# Patient Record
Sex: FEMALE | Race: WHITE | NOT HISPANIC OR LATINO | Employment: UNEMPLOYED | URBAN - METROPOLITAN AREA
[De-identification: names, ages, dates, MRNs, and addresses within clinical notes are randomized per-mention and may not be internally consistent; named-entity substitution may affect disease eponyms.]

---

## 2020-02-24 ENCOUNTER — OFFICE VISIT (OUTPATIENT)
Dept: FAMILY MEDICINE CLINIC | Facility: CLINIC | Age: 40
End: 2020-02-24

## 2020-02-24 VITALS
HEART RATE: 88 BPM | OXYGEN SATURATION: 97 % | WEIGHT: 189 LBS | DIASTOLIC BLOOD PRESSURE: 74 MMHG | SYSTOLIC BLOOD PRESSURE: 131 MMHG | TEMPERATURE: 98.4 F

## 2020-02-24 DIAGNOSIS — Z00.00 PREVENTATIVE HEALTH CARE: ICD-10-CM

## 2020-02-24 DIAGNOSIS — R30.0 DYSURIA: ICD-10-CM

## 2020-02-24 DIAGNOSIS — G89.4 CHRONIC PAIN SYNDROME: ICD-10-CM

## 2020-02-24 DIAGNOSIS — Z71.6 ENCOUNTER FOR SMOKING CESSATION COUNSELING: ICD-10-CM

## 2020-02-24 DIAGNOSIS — F33.2: Primary | ICD-10-CM

## 2020-02-24 DIAGNOSIS — N30.01 ACUTE CYSTITIS WITH HEMATURIA: ICD-10-CM

## 2020-02-24 DIAGNOSIS — R53.83 FATIGUE, UNSPECIFIED TYPE: ICD-10-CM

## 2020-02-24 DIAGNOSIS — F41.9 ANXIETY: ICD-10-CM

## 2020-02-24 DIAGNOSIS — F17.200 SMOKER: ICD-10-CM

## 2020-02-24 LAB
ALBUMIN SERPL-MCNC: 4.9 G/DL (ref 3.5–5.2)
ALBUMIN/GLOB SERPL: 2.1 G/DL
ALP SERPL-CCNC: 89 U/L (ref 39–117)
ALT SERPL W P-5'-P-CCNC: 12 U/L (ref 1–33)
ANION GAP SERPL CALCULATED.3IONS-SCNC: 13.6 MMOL/L (ref 5–15)
AST SERPL-CCNC: 9 U/L (ref 1–32)
BASOPHILS # BLD AUTO: 0.05 10*3/MM3 (ref 0–0.2)
BASOPHILS NFR BLD AUTO: 0.5 % (ref 0–1.5)
BILIRUB BLD-MCNC: NEGATIVE MG/DL
BILIRUB SERPL-MCNC: 0.2 MG/DL (ref 0.2–1.2)
BUN BLD-MCNC: 13 MG/DL (ref 6–20)
BUN/CREAT SERPL: 16.7 (ref 7–25)
CALCIUM SPEC-SCNC: 10.2 MG/DL (ref 8.6–10.5)
CHLORIDE SERPL-SCNC: 97 MMOL/L (ref 98–107)
CHOLEST SERPL-MCNC: 168 MG/DL (ref 0–200)
CLARITY, POC: CLEAR
CO2 SERPL-SCNC: 26.4 MMOL/L (ref 22–29)
COLOR UR: YELLOW
CREAT BLD-MCNC: 0.78 MG/DL (ref 0.57–1)
DEPRECATED RDW RBC AUTO: 44.5 FL (ref 37–54)
EOSINOPHIL # BLD AUTO: 0.23 10*3/MM3 (ref 0–0.4)
EOSINOPHIL NFR BLD AUTO: 2.3 % (ref 0.3–6.2)
ERYTHROCYTE [DISTWIDTH] IN BLOOD BY AUTOMATED COUNT: 13.6 % (ref 12.3–15.4)
GFR SERPL CREATININE-BSD FRML MDRD: 83 ML/MIN/1.73
GLOBULIN UR ELPH-MCNC: 2.3 GM/DL
GLUCOSE BLD-MCNC: 78 MG/DL (ref 65–99)
GLUCOSE UR STRIP-MCNC: NEGATIVE MG/DL
HBA1C MFR BLD: 5.4 % (ref 3.5–5.6)
HCT VFR BLD AUTO: 40.9 % (ref 34–46.6)
HDLC SERPL-MCNC: 32 MG/DL (ref 40–60)
HGB BLD-MCNC: 14.2 G/DL (ref 12–15.9)
IMM GRANULOCYTES # BLD AUTO: 0.05 10*3/MM3 (ref 0–0.05)
IMM GRANULOCYTES NFR BLD AUTO: 0.5 % (ref 0–0.5)
KETONES UR QL: NEGATIVE
LDLC SERPL CALC-MCNC: 89 MG/DL (ref 0–100)
LDLC/HDLC SERPL: 2.79 {RATIO}
LEUKOCYTE EST, POC: ABNORMAL
LYMPHOCYTES # BLD AUTO: 2.39 10*3/MM3 (ref 0.7–3.1)
LYMPHOCYTES NFR BLD AUTO: 23.7 % (ref 19.6–45.3)
MCH RBC QN AUTO: 31.1 PG (ref 26.6–33)
MCHC RBC AUTO-ENTMCNC: 34.7 G/DL (ref 31.5–35.7)
MCV RBC AUTO: 89.5 FL (ref 79–97)
MONOCYTES # BLD AUTO: 1.07 10*3/MM3 (ref 0.1–0.9)
MONOCYTES NFR BLD AUTO: 10.6 % (ref 5–12)
NEUTROPHILS # BLD AUTO: 6.29 10*3/MM3 (ref 1.7–7)
NEUTROPHILS NFR BLD AUTO: 62.4 % (ref 42.7–76)
NITRITE UR-MCNC: NEGATIVE MG/ML
NRBC BLD AUTO-RTO: 0 /100 WBC (ref 0–0.2)
PH UR: 6 [PH] (ref 5–8)
PLATELET # BLD AUTO: 356 10*3/MM3 (ref 140–450)
PMV BLD AUTO: 10 FL (ref 6–12)
POTASSIUM BLD-SCNC: 4.2 MMOL/L (ref 3.5–5.2)
PROT SERPL-MCNC: 7.2 G/DL (ref 6–8.5)
PROT UR STRIP-MCNC: NEGATIVE MG/DL
RBC # BLD AUTO: 4.57 10*6/MM3 (ref 3.77–5.28)
RBC # UR STRIP: ABNORMAL /UL
SODIUM BLD-SCNC: 137 MMOL/L (ref 136–145)
SP GR UR: 1.03 (ref 1–1.03)
TRIGL SERPL-MCNC: 233 MG/DL (ref 0–150)
TSH SERPL DL<=0.05 MIU/L-ACNC: 2.67 UIU/ML (ref 0.27–4.2)
UROBILINOGEN UR QL: NORMAL
VIT B12 BLD-MCNC: 661 PG/ML (ref 211–946)
VLDLC SERPL-MCNC: 46.6 MG/DL (ref 5–40)
WBC NRBC COR # BLD: 10.08 10*3/MM3 (ref 3.4–10.8)

## 2020-02-24 PROCEDURE — 82607 VITAMIN B-12: CPT | Performed by: PHYSICIAN ASSISTANT

## 2020-02-24 PROCEDURE — 80061 LIPID PANEL: CPT | Performed by: PHYSICIAN ASSISTANT

## 2020-02-24 PROCEDURE — 84443 ASSAY THYROID STIM HORMONE: CPT | Performed by: PHYSICIAN ASSISTANT

## 2020-02-24 PROCEDURE — 85025 COMPLETE CBC W/AUTO DIFF WBC: CPT | Performed by: PHYSICIAN ASSISTANT

## 2020-02-24 PROCEDURE — 86038 ANTINUCLEAR ANTIBODIES: CPT | Performed by: PHYSICIAN ASSISTANT

## 2020-02-24 PROCEDURE — 36415 COLL VENOUS BLD VENIPUNCTURE: CPT | Performed by: PHYSICIAN ASSISTANT

## 2020-02-24 PROCEDURE — 80053 COMPREHEN METABOLIC PANEL: CPT | Performed by: PHYSICIAN ASSISTANT

## 2020-02-24 PROCEDURE — 99204 OFFICE O/P NEW MOD 45 MIN: CPT | Performed by: PHYSICIAN ASSISTANT

## 2020-02-24 PROCEDURE — 87086 URINE CULTURE/COLONY COUNT: CPT | Performed by: PHYSICIAN ASSISTANT

## 2020-02-24 PROCEDURE — 99406 BEHAV CHNG SMOKING 3-10 MIN: CPT | Performed by: PHYSICIAN ASSISTANT

## 2020-02-24 PROCEDURE — 83036 HEMOGLOBIN GLYCOSYLATED A1C: CPT | Performed by: PHYSICIAN ASSISTANT

## 2020-02-24 RX ORDER — HYDROXYZINE 50 MG/1
50 TABLET, FILM COATED ORAL
COMMUNITY
Start: 2020-02-20 | End: 2023-02-07

## 2020-02-24 RX ORDER — VITAMIN A ACETATE, BETA CAROTENE, ASCORBIC ACID, CHOLECALCIFEROL, .ALPHA.-TOCOPHEROL ACETATE, DL-, THIAMINE MONONITRATE, RIBOFLAVIN, NIACINAMIDE, PYRIDOXINE HYDROCHLORIDE, FOLIC ACID, CYANOCOBALAMIN, CALCIUM CARBONATE, FERROUS FUMARATE, ZINC OXIDE, CUPRIC OXIDE 3080; 12; 120; 400; 1; 1.84; 3; 20; 22; 920; 25; 200; 27; 10; 2 [IU]/1; UG/1; MG/1; [IU]/1; MG/1; MG/1; MG/1; MG/1; MG/1; [IU]/1; MG/1; MG/1; MG/1; MG/1; MG/1
1 TABLET, FILM COATED ORAL DAILY
COMMUNITY
Start: 2019-05-02 | End: 2023-02-07

## 2020-02-24 RX ORDER — DULOXETIN HYDROCHLORIDE 30 MG/1
30 CAPSULE, DELAYED RELEASE ORAL DAILY
Qty: 30 CAPSULE | Refills: 5 | Status: SHIPPED | OUTPATIENT
Start: 2020-02-24 | End: 2020-02-27 | Stop reason: SDUPTHER

## 2020-02-24 RX ORDER — VORTIOXETINE 20 MG/1
20 TABLET, FILM COATED ORAL DAILY
COMMUNITY
Start: 2020-02-19 | End: 2020-02-24

## 2020-02-24 RX ORDER — NICOTINE 21 MG/24HR
1 PATCH, TRANSDERMAL 24 HOURS TRANSDERMAL EVERY 24 HOURS
Qty: 45 PATCH | Refills: 0 | Status: SHIPPED | OUTPATIENT
Start: 2020-02-24 | End: 2020-04-09

## 2020-02-24 RX ORDER — CHLORAL HYDRATE 500 MG
1000 CAPSULE ORAL DAILY
COMMUNITY

## 2020-02-24 RX ORDER — NITROFURANTOIN 25; 75 MG/1; MG/1
100 CAPSULE ORAL 2 TIMES DAILY
Qty: 14 CAPSULE | Refills: 0 | Status: SHIPPED | OUTPATIENT
Start: 2020-02-24 | End: 2020-03-02

## 2020-02-24 RX ORDER — LAMOTRIGINE 25 MG/1
25 TABLET ORAL DAILY
COMMUNITY
Start: 2020-02-17 | End: 2020-02-24

## 2020-02-24 RX ORDER — VORTIOXETINE 20 MG/1
10 TABLET, FILM COATED ORAL DAILY
Status: CANCELLED
Start: 2020-02-24

## 2020-02-24 RX ORDER — SACCHAROMYCES BOULARDII 250 MG
250 CAPSULE ORAL DAILY
Qty: 90 CAPSULE | Refills: 3 | Status: SHIPPED | OUTPATIENT
Start: 2020-02-24 | End: 2023-02-07

## 2020-02-24 RX ORDER — LANOLIN ALCOHOL/MO/W.PET/CERES
6 CREAM (GRAM) TOPICAL DAILY
COMMUNITY
End: 2023-02-07

## 2020-02-24 RX ORDER — NICOTINE 21 MG/24HR
1 PATCH, TRANSDERMAL 24 HOURS TRANSDERMAL EVERY 24 HOURS
Qty: 14 PATCH | Refills: 0 | Status: SHIPPED | OUTPATIENT
Start: 2020-02-24 | End: 2020-03-09

## 2020-02-24 RX ORDER — ALBUTEROL SULFATE 90 UG/1
2 AEROSOL, METERED RESPIRATORY (INHALATION)
COMMUNITY
Start: 2019-08-27 | End: 2023-02-07

## 2020-02-24 RX ORDER — LAMOTRIGINE 100 MG/1
100 TABLET ORAL DAILY
COMMUNITY
Start: 2020-02-20 | End: 2023-02-07

## 2020-02-24 NOTE — PROGRESS NOTES
Subjective  Debbie Estevez is a 39 y.o. female     History of Present Illness  Patient is a 39-year-old white female here for new primary care provider and establishment with our office.  She has numerous conditions, and some new problems which include:    1.  Anxiety and depression: Patient is currently taking Trintellix 20 mg once daily and Lamictal 100 mg daily and hydroxyzine 50 mg as needed.  She states her anxiety and depression is not well controlled, she continues to feel anxious and depressed every day.  She had a baby 8 months ago, her symptoms have been progressively worsening since then.  She has been seen by psychiatry, however they stated that they could not improve her condition and recommended she follow-up with a different kind of specialist, she states she did not get a referral.    2.  Sleep disorder: Patient is currently taking melatonin 6 mg at bedtime.    3.  Vitamin D deficiency: Patient is currently taking 1000 units of vitamin D daily.    4.  Iron and B12 deficiency: Patient is currently taking vitamin B12 1000 mcg daily.    5.  New problem: Urinary symptoms: Patient complains of urinary symptoms for the past 3 days, with burning, frequency, and pain after urination.  She also has vaginal itching, however denies discharge or concern for STD.    6.  New problem: Chronic pain: Patient complains of chronic pain all over her body, she states she feels like she is wearing a coat of pain, it is worse when she wakes up in the morning, however it is usually severe all day long as well.  She complains of fatigue and pain for many years, her psychiatrist recommended she receive additional testing for this and for fibromyalgia.    7.  New problem: Abdominal pain: Patient complains of left-sided abdominal pain at the level of the umbilicus, she states it is worse with lifting, however it comes and goes and is not always present.  She denies trauma to the area, and nausea vomiting and diarrhea.    The  following portions of the patient's history were reviewed and updated as appropriate: allergies, current medications, past family history, past medical history, past social history, past surgical history and problem list.    Review of Systems   Constitutional: Negative for fever and unexpected weight loss.   HENT: Negative for ear pain and sore throat.    Eyes: Negative for blurred vision.   Respiratory: Negative for cough and shortness of breath.    Cardiovascular: Negative for chest pain.   Gastrointestinal: Negative for abdominal pain, blood in stool, diarrhea, nausea and vomiting.   Genitourinary: Positive for dysuria and frequency. Negative for flank pain and urgency.   Musculoskeletal: Negative for joint swelling.   Neurological: Negative for syncope, headache and confusion.   Psychiatric/Behavioral: Positive for depressed mood. Negative for suicidal ideas. The patient is nervous/anxious.        Objective  Physical Exam   Constitutional: She is oriented to person, place, and time. She appears well-developed and well-nourished. She is obese.  HENT:   Head: Normocephalic and atraumatic.   Right Ear: External ear normal.   Left Ear: External ear normal.   Nose: Nose normal.   Mouth/Throat: Oropharynx is clear and moist.   Eyes: Pupils are equal, round, and reactive to light. Conjunctivae and EOM are normal.   Neck: Normal range of motion. Neck supple.   Cardiovascular: Normal rate, regular rhythm and normal heart sounds.   Pulmonary/Chest: Effort normal and breath sounds normal.   Abdominal: Soft. Bowel sounds are normal.   Musculoskeletal: Normal range of motion.   Neurological: She is alert and oriented to person, place, and time.   Psychiatric: She has a normal mood and affect. Her behavior is normal.       Vitals:    02/24/20 1013   BP: 131/74   BP Location: Right arm   Patient Position: Sitting   Cuff Size: Adult   Pulse: 88   Temp: 98.4 °F (36.9 °C)   TempSrc: Oral   SpO2: 97%   Weight: 85.7 kg (189 lb)      There is no height or weight on file to calculate BMI.    PHQ-9 Total Score: 12    Brief Urine Lab Results  (Last result in the past 365 days)      Color   Clarity   Blood   Leuk Est   Nitrite   Protein   CREAT   Urine HCG        02/24/20 1446 Yellow Clear Small Small (1+) Negative Negative               Assessment/Plan  Diagnoses and all orders for this visit:    1. Major depressive disorder, recurrent episode, severe with peripartum onset (CMS/HCC) (Primary)  Comments:  I am reducing patient's Trintellix down to 10 mg daily and adding Cymbalta 30 mg daily to help with depression and pain.    2. Anxiety  Comments:  Reducing Trintellix to 10 mg and starting Cymbalta 30 mg daily.    3. Smoker  Comments:  Patient is a current everyday smoker interested in quitting.    4. Fatigue, unspecified type  Comments:  Laboratory testing today to determine if there are any physical conditions contributing to her fatigue, I suspect it is related to her severe depression.  Orders:  -     Vitamin B12  -     TSH  -     Hemoglobin A1c  -     Comprehensive Metabolic Panel  -     CBC & Differential  -     Vitamin B1, Whole Blood  -     CBC Auto Differential    5. Preventative health care  Comments:  Preventative testing is up-to-date, recommended a whole foods, plant-based diet and regular physical activity for overall health and wellness.  Orders:  -     Lipid Panel    6. Encounter for smoking cessation counseling  Comments:  I spent 10 minutes face-to-face with patient on smoking cessation counseling we decided her best option is nicotine patches, she is agreeable to this.  She plans to quit smoking within the first 2 weeks of starting nicotine patches.    7. Acute cystitis with hematuria  Comments:  Treating patient with Macrobid for urinary tract infection.    8. Chronic pain syndrome  Laboratory testing including an PEDRITO today.  Starting patient on Cymbalta to combat chronic pain.  -     PEDRITO    Other orders  -     nicotine  (NICODERM CQ) 14 MG/24HR patch; Place 1 patch on the skin as directed by provider Daily for 45 days.  Dispense: 45 patch; Refill: 0  -     nicotine (NICODERM CQ) 14 MG/24HR patch; Place 1 patch on the skin as directed by provider Daily for 14 days.  Dispense: 14 patch; Refill: 0  -     nicotine (NICODERM CQ) 7 MG/24HR patch; Place 1 patch on the skin as directed by provider Daily for 14 days.  Dispense: 14 patch; Refill: 0  -     Cancel: TRINTELLIX 20 MG tablet; Take 10 mg by mouth Daily.  -     DULoxetine (CYMBALTA) 30 MG capsule; Take 1 capsule by mouth Daily.  Dispense: 30 capsule; Refill: 5  -     nitrofurantoin, macrocrystal-monohydrate, (MACROBID) 100 MG capsule; Take 1 capsule by mouth 2 (Two) Times a Day for 7 days.  Dispense: 14 capsule; Refill: 0  -     Vortioxetine HBr (TRINTELLIX) 10 MG tablet; Take 10 mg by mouth Daily.  Dispense: 30 tablet; Refill: 5  -     saccharomyces boulardii (FLORASTOR) 250 MG capsule; Take 1 capsule by mouth Daily.  Dispense: 90 capsule; Refill: 3

## 2020-02-25 LAB — BACTERIA SPEC AEROBE CULT: NORMAL

## 2020-02-26 ENCOUNTER — TELEPHONE (OUTPATIENT)
Dept: FAMILY MEDICINE CLINIC | Facility: CLINIC | Age: 40
End: 2020-02-26

## 2020-02-26 LAB — ANA SER QL: NEGATIVE

## 2020-02-26 NOTE — TELEPHONE ENCOUNTER
Insurance will not pay for medication but with goodrx  [facility] 13.79 and [facility] 15.00 or [place]

## 2020-02-27 RX ORDER — DULOXETIN HYDROCHLORIDE 30 MG/1
30 CAPSULE, DELAYED RELEASE ORAL DAILY
Qty: 30 CAPSULE | Refills: 5 | Status: SHIPPED | OUTPATIENT
Start: 2020-02-27 | End: 2023-02-07

## 2020-03-03 ENCOUNTER — TELEPHONE (OUTPATIENT)
Dept: FAMILY MEDICINE CLINIC | Facility: CLINIC | Age: 40
End: 2020-03-03

## 2020-03-13 ENCOUNTER — TELEPHONE (OUTPATIENT)
Dept: FAMILY MEDICINE CLINIC | Facility: CLINIC | Age: 40
End: 2020-03-13

## 2020-03-13 NOTE — TELEPHONE ENCOUNTER
Pt continues to have a UTI with no pain but continues to have frequent urination (radha) Pt has not came in to complete any labs due to the coronavirus

## 2020-03-16 RX ORDER — SULFAMETHOXAZOLE AND TRIMETHOPRIM 800; 160 MG/1; MG/1
1 TABLET ORAL 2 TIMES DAILY
Qty: 7 TABLET | Refills: 0 | Status: SHIPPED | OUTPATIENT
Start: 2020-03-16 | End: 2020-03-19

## 2020-03-16 NOTE — TELEPHONE ENCOUNTER
Please inform the patient I have sent a prescription for Bactrim to the pharmacy, if symptoms or not improving after this she should be seen.

## 2020-03-18 ENCOUNTER — TELEPHONE (OUTPATIENT)
Dept: FAMILY MEDICINE CLINIC | Facility: CLINIC | Age: 40
End: 2020-03-18

## 2020-03-18 NOTE — TELEPHONE ENCOUNTER
Pt called. Pharmacy needs you to send new rx with correct qty for Bactrim. Only 7 was sent in instead of 14. [facility].

## 2020-03-19 RX ORDER — SULFAMETHOXAZOLE AND TRIMETHOPRIM 800; 160 MG/1; MG/1
1 TABLET ORAL 2 TIMES DAILY
Qty: 14 TABLET | Refills: 0 | Status: SHIPPED | OUTPATIENT
Start: 2020-03-19 | End: 2020-03-26

## 2022-03-08 ENCOUNTER — HOSPITAL ENCOUNTER (EMERGENCY)
Facility: HOSPITAL | Age: 42
Discharge: HOME | End: 2022-03-08
Attending: EMERGENCY MEDICINE | Admitting: EMERGENCY MEDICINE

## 2022-03-08 VITALS
HEART RATE: 87 BPM | OXYGEN SATURATION: 98 % | TEMPERATURE: 97.6 F | SYSTOLIC BLOOD PRESSURE: 160 MMHG | WEIGHT: 190.92 LBS | BODY MASS INDEX: 35.13 KG/M2 | DIASTOLIC BLOOD PRESSURE: 98 MMHG | HEIGHT: 62 IN | RESPIRATION RATE: 20 BRPM

## 2022-03-08 DIAGNOSIS — F41.9 ANXIETY: ICD-10-CM

## 2022-03-08 DIAGNOSIS — R03.0 ELEVATED BLOOD PRESSURE READING: Primary | ICD-10-CM

## 2022-03-08 PROCEDURE — 99282 EMERGENCY DEPT VISIT SF MDM: CPT

## 2022-03-09 NOTE — PROGRESS NOTES
CHIEF COMPLAINT  Generalized pain      Subjective   Debbie ABELARDO Estevez is a 41 y.o. female who was referred by JEAN CARLOS Hay   to our pain management clinic for consultation, evaluation and treatment of chronic generalized pain and lower back pain.    Pain has been going on for some time but has been significantly worsened postpartum 3 years ago. Her lower back pain started about 3 months ago without any injuries. Pain was previously intermittent in past, but has been constant and worsened. Work-up from rheumatology was negative as per patient.  Notes from rheumatology Dr. Miguel Zamudio at [place] reviewed.  Last seen on 2/23/2022.  It was noted that most of her pain is originating from fibromyalgia.  There was no suspicion for SLE.  Right knee effusion worse aspirated and was noninflammatory. She has been told that she needs surgery for carpal tunnel syndrome, but unable to do so due to small child. She is trying to loose weight, meditate, trying to quit smoking.     She is currently on Medrol dose pack which has improved pain.     Lower back is 6/10 on VAS, at maximum is 7/10. Pain is aching, shooting and stabbing in nature. Pain is referred left buttock, left posterior thigh, left knee aster. The pain is constant. The pain is improved by nothing. The pain is worse with nothing in perticular, weather changes. She has severe muscle spasms in back.     PHQ-9- 21  SOAPP- 6- CBD vaping in past.     PMH:   Bipolar, agoraphobia, fibromyalgia   Has seen psychiatrist NP    Current Medications:   Gabapentin 300 mg TID (3/4/22) - makes her dizzy; takes for 1 month- only taking BID.   Celebrex- 100 mg BID PRN  Abilify  Pristiq  Nicotine patch  Vibryd     Past Medications:  Celebrex-did not work  Diclofenac-did not work  amitriptyline, Wellbutrin, Effexor, Trintellix, Zoloft, Cymbalta- helped (had bladder incontinenece), Xanax, BuSpar,Vraylar    Past Modalities:  TENS:       no          Physical Therapy Within The Last 6  Months     no  Psychotherapy     no  Massage Therapy      no    Patient Complains Of:  Uro-Fecal Incontinence no  Weight Gain/Loss  Yes (15 lbs in 3 month).   Fever/Chills   no  Weakness   no      PEG Assessment   What number best describes your pain on average in the past week?6  What number best describes how, during the past week, pain has interfered with your enjoyment of life?6  What number best describes how, during the past week, pain has interfered with your general activity?  6        Current Outpatient Medications:   •  albuterol sulfate  (90 Base) MCG/ACT inhaler, Inhale 2 puffs., Disp: , Rfl:   •  ascorbic acid (VITAMIN C) 1000 MG tablet, , Disp: , Rfl:   •  atomoxetine (STRATTERA) 40 MG capsule, Take 40 mg by mouth Daily., Disp: , Rfl:   •  celecoxib (CeleBREX) 100 MG capsule, Take 100 mg by mouth., Disp: , Rfl:   •  cholecalciferol (Cholecalciferol) 25 MCG (1000 UT) tablet, Take 1,000 Units by mouth Daily., Disp: , Rfl:   •  Cholecalciferol (Vitamin D3) 50 MCG (2000 UT) tablet, Take 1 tablet by mouth Daily., Disp: , Rfl:   •  cyanocobalamin (VITAMIN B-12) 1000 MCG tablet, Take 1 tablet by mouth Daily., Disp: , Rfl:   •  DULoxetine (CYMBALTA) 30 MG capsule, Take 1 capsule by mouth Daily., Disp: 30 capsule, Rfl: 5  •  fluticasone (FLONASE) 50 MCG/ACT nasal spray, 1 spray 2 (Two) Times a Day. shake liquid, Disp: , Rfl:   •  gabapentin (NEURONTIN) 300 MG capsule, Take 300 mg by mouth 3 (Three) Times a Day., Disp: , Rfl:   •  hydrOXYzine (ATARAX) 50 MG tablet, Take 50 mg by mouth., Disp: , Rfl:   •  lamoTRIgine (LaMICtal) 100 MG tablet, Take 100 mg by mouth Daily., Disp: , Rfl:   •  melatonin 3 MG tablet, Take 6 mg by mouth Daily., Disp: , Rfl:   •  methylPREDNISolone (MEDROL) 4 MG dose pack, follow package directions, Disp: , Rfl:   •  Omega-3 Fatty Acids (FISH OIL) 1000 MG capsule capsule, Take 1,000 mg by mouth Daily., Disp: , Rfl:   •  prenatal vitamins (PRENATAL 27-1) 27-1 MG tablet tablet, Take  1 tablet by mouth Daily., Disp: , Rfl:   •  saccharomyces boulardii (FLORASTOR) 250 MG capsule, Take 1 capsule by mouth Daily., Disp: 90 capsule, Rfl: 3  •  Vortioxetine HBr (TRINTELLIX) 10 MG tablet, Take 10 mg by mouth Daily., Disp: 30 tablet, Rfl: 5  •  tiZANidine (ZANAFLEX) 4 MG tablet, Take 1 tablet by mouth 3 (Three) Times a Day As Needed for Muscle Spasms for up to 30 days., Disp: 90 tablet, Rfl: 0    The following portions of the patient's history were reviewed and updated as appropriate: allergies, current medications, past family history, past medical history, past social history, past surgical history, and problem list.      REVIEW OF PERTINENT MEDICAL DATA    Past Medical History:   Diagnosis Date   • Allergic    • Anxiety    • Asthma    • Depression    • Headache    • Hearing loss    • Low back pain    • Pneumonia    • Urinary tract infection      Past Surgical History:   Procedure Laterality Date   •  SECTION     • KNEE SURGERY       Family History   Problem Relation Age of Onset   • Anxiety disorder Mother    • Arthritis Mother    • COPD Mother    • Depression Mother    • Diabetes Mother    • Hypertension Mother    • Thyroid disease Mother    • Vision loss Mother    • Alcohol abuse Father    • Anxiety disorder Maternal Aunt    • Anxiety disorder Maternal Grandmother    • Arthritis Maternal Grandmother    • COPD Maternal Grandmother    • Diabetes Maternal Grandmother    • Heart disease Maternal Grandmother    • Hyperlipidemia Maternal Grandmother    • Hypertension Maternal Grandmother    • Miscarriages / Stillbirths Maternal Grandmother    • Thyroid disease Maternal Grandmother    • Vision loss Maternal Grandmother    • Alcohol abuse Maternal Grandfather    • Alcohol abuse Paternal Grandfather      Social History     Socioeconomic History   • Marital status: Single   Tobacco Use   • Smoking status: Current Every Day Smoker     Packs/day: 0.25     Types: Cigarettes   • Smokeless tobacco: Never  "Used   Vaping Use   • Vaping Use: Every day   • Substances: Nicotine   Substance and Sexual Activity   • Alcohol use: Not Currently   • Drug use: Not Currently   • Sexual activity: Yes     Partners: Male         Review of Systems   Respiratory: Positive for chest tightness and shortness of breath.    Cardiovascular: Positive for chest pain and palpitations.   Musculoskeletal: Positive for back pain, neck pain and neck stiffness.   Neurological: Positive for dizziness, weakness and headaches.         Vitals:    03/14/22 0833   BP: 162/94   Pulse: 98   Resp: 16   SpO2: 99%   Weight: 86.2 kg (190 lb)   Height: 157.5 cm (62\")   PainSc:   6         Objective   Physical Exam  Musculoskeletal:         General: Tenderness present.        Legs:    Neurological:      Deep Tendon Reflexes:      Reflex Scores:       Patellar reflexes are 2+ on the right side and 2+ on the left side.       Achilles reflexes are 2+ on the right side and 2+ on the left side.     Comments: Motor strength 5/5 b/l LE  Sensory intact b/l LE             Imaging Reviewed:  None available to review.    Assessment:    1. Chronic pain syndrome    2. Fibromyalgia    3. Chronic low back pain with sciatica, sciatica laterality unspecified, unspecified back pain laterality    4. Sacroiliac joint dysfunction         Plan:   1. UDS Deferred for now.   2. We discussed trying a course of formal physical therapy. Physical therapy can help strengthen and stretch the muscles around the joints. Continue to be as active as possible. Start physical therapy as it will help generalized pain and follow up with [facility]. Prescription sent- 3/14/22  3. Discussed with the patient regarding the etiology of their pain. Informed them that they would likely benefit from a course of non-opioid therapy.   4.  Advised to increase gabapentin to 300 mg TID.  Patient is currently taking it BID.  Discussed with patient that drowsiness does improve with long use of gabapentin.  If it " continues to be issue we will consider switching her to Lyrica.  Patient understands and agrees with the plan.  Lyrica will be helpful in treating her fibromyalgia pain.  5.  Patient has significant muscle spasms intermittently in her lower back.  Patient also has significant myofascial tenderness in her lower back and will benefit from muscle relaxant.  Start tizanidine 4 mg TID PRN. Common side effects discussed with the patient including but not limited to dry mouth, drowsiness, dizziness, lightheadedness, constipation, weakness and tiredness.   6.  Will obtain lumbar x-ray to evaluate her lower back pain further.  From my physical examination patient has left-sided SI joint dysfunction.  We will consider SI joint injection in future.  7. Patient counseled on the importance of smoking cessation for overall health and pain control. Time spent- 4 min    RTC in 1 month.    Babak Delatorre DO  Pain Management   [facility]         INSPECT REPORT    As part of the patient's treatment plan, I may be prescribing controlled substances. The patient has been made aware of appropriate use of such medications, including potential risk of somnolence, limited ability to drive and/or work safely, and the potential for dependence or overdose. It has also been made clear that these medications are for use by this patient only, without concomitant use of alcohol or other substances unless prescribed.     Patient has completed prescribing agreement detailing terms of continued prescribing of controlled substances, including monitoring INSPECT reports, urine drug screening, and pill counts if necessary. The patient is aware that inappropriate use will results in cessation of prescribing such medications.    INSPECT report has been reviewed and scanned into the patient's chart.      EMR Dragon/Transcription Disclaimer:   Much of this encounter note is an electronic transcription/translation of spoken language to printed text. The  electronic translation of spoken language may permit erroneous, or at times, nonsensical words or phrases to be inadvertently transcribed; Although I have reviewed the note for such errors, some may still exist.

## 2022-03-09 NOTE — ED NOTES
Pt. States that she had been off Viibryd and got samples today. Took half, felt strange and took BP on home radial monitor which she states was high. Vitals stable at time of triage. No acute distress. Awake/alert/oriented. No needs.     Essence Maddox, RN  03/08/22 [id number]

## 2022-03-09 NOTE — DISCHARGE INSTRUCTIONS
Please take Viibryd as previously prescribed.  Please call [facility] in the morning for further samples of this as discussed.  Please come back to the ER if you have severe chest pain or shortness of breath you will need reevaluation that time.

## 2022-03-09 NOTE — ED PROVIDER NOTES
"Subjective     Patient is a 41-year-old female comes in complaining of being out of her home medication for the past 2 days.  Patient states that she takes Viibryd and has been out of this the past 2 days.  Patient states that she called [facility], who fills her prescriptions for this, because she was out of medicine and was able to get samples of this today.  Patient states that she takes 20 mg daily of this but they only had 40 mg samples.  Patient states she has trouble having her insurance cover this medicine and has to rely on samples from [facility] for this.  Patient was instructed to cut these in half.  Patient states that since she took this that things felt \" off\" and decided to check her blood pressure that was found to be elevated over 200 in which she uses a wrist cuff blood pressure machine.  Patient states that \" my blood pressure was fine when I got here\" but I still wanted to be seen but now that I have been waiting here I would like to go home.  Patient denied any fever, nausea, vomiting, diarrhea, chest pain, shortness of breath, syncopal episode or any acute pain at this time.  Patient denied needing any refills of her home medicine.  Patient denies any SI or HI.        Review of Systems   Constitutional: Negative for chills, fatigue and fever.   HENT: Negative for congestion, sore throat, tinnitus and trouble swallowing.    Eyes: Negative for photophobia, discharge and visual disturbance.   Respiratory: Negative for cough and shortness of breath.    Cardiovascular: Negative for chest pain, palpitations and leg swelling.   Gastrointestinal: Negative for abdominal pain, blood in stool, diarrhea, nausea and vomiting.   Genitourinary: Negative for dysuria, flank pain and urgency.   Musculoskeletal: Negative for arthralgias and myalgias.   Skin: Negative for rash.   Neurological: Negative for dizziness, syncope, light-headedness and headaches.   Psychiatric/Behavioral: Negative for confusion and " self-injury. The patient is nervous/anxious.        Past Medical History:   Diagnosis Date   • Allergic    • Anxiety    • Asthma    • Depression    • Headache    • Low back pain    • Pneumonia    • Urinary tract infection        Allergies   Allergen Reactions   • Latex Itching and Other (See Comments)     Burning        Past Surgical History:   Procedure Laterality Date   •  SECTION         Family History   Problem Relation Age of Onset   • Anxiety disorder Mother    • Arthritis Mother    • COPD Mother    • Depression Mother    • Diabetes Mother    • Hypertension Mother    • Thyroid disease Mother    • Vision loss Mother    • Alcohol abuse Father    • Anxiety disorder Maternal Aunt    • Anxiety disorder Maternal Grandmother    • Arthritis Maternal Grandmother    • COPD Maternal Grandmother    • Diabetes Maternal Grandmother    • Heart disease Maternal Grandmother    • Hyperlipidemia Maternal Grandmother    • Hypertension Maternal Grandmother    • Miscarriages / Stillbirths Maternal Grandmother    • Thyroid disease Maternal Grandmother    • Vision loss Maternal Grandmother    • Alcohol abuse Maternal Grandfather    • Alcohol abuse Paternal Grandfather        Social History     Socioeconomic History   • Marital status: Single   Tobacco Use   • Smoking status: Current Every Day Smoker     Types: Cigarettes   • Smokeless tobacco: Never Used   Substance and Sexual Activity   • Alcohol use: Not Currently   • Drug use: Not Currently   • Sexual activity: Yes     Partners: Male           Objective   Physical Exam  Vitals and nursing note reviewed.   Constitutional:       General: She is not in acute distress.     Appearance: She is well-developed. She is not diaphoretic.   HENT:      Head: Normocephalic and atraumatic.      Right Ear: External ear normal.      Left Ear: External ear normal.      Nose: Nose normal.      Mouth/Throat:      Pharynx: No oropharyngeal exudate.   Eyes:      Extraocular Movements:  "Extraocular movements intact.      Conjunctiva/sclera: Conjunctivae normal.      Pupils: Pupils are equal, round, and reactive to light.   Cardiovascular:      Rate and Rhythm: Normal rate and regular rhythm.      Pulses: Normal pulses.      Heart sounds: Normal heart sounds.      Comments: S1, S2 audible.  Pulmonary:      Effort: Pulmonary effort is normal. No respiratory distress.      Breath sounds: Normal breath sounds. No wheezing, rhonchi or rales.      Comments: On room air.  Abdominal:      General: Bowel sounds are normal. There is no distension.      Palpations: Abdomen is soft.      Tenderness: There is no abdominal tenderness. There is no guarding or rebound.   Musculoskeletal:         General: No tenderness or deformity. Normal range of motion.      Cervical back: Normal range of motion.      Right lower leg: No edema.      Left lower leg: No edema.   Skin:     General: Skin is warm.      Capillary Refill: Capillary refill takes less than 2 seconds.      Findings: No erythema or rash.   Neurological:      General: No focal deficit present.      Mental Status: She is alert and oriented to person, place, and time.      Cranial Nerves: No cranial nerve deficit.   Psychiatric:         Mood and Affect: Mood normal.         Behavior: Behavior normal.      Comments: Patient appears somewhat anxious but is a reliable historian and appears in no acute distress.         Procedures           ED Course      /98 (BP Location: Left arm, Patient Position: Sitting)   Pulse 87   Temp 97.6 °F (36.4 °C) (Oral)   Resp 20   Ht 157.5 cm (62\")   Wt 86.6 kg (190 lb 14.7 oz)   LMP 03/02/2022 (Exact Date)   SpO2 98%   BMI 34.92 kg/m²   Labs Reviewed - No data to display  No radiology results for the last day                                             MDM       Chart review:    EKG: Not indicated   Imaging: Not indicated  Labs: Not indicated  Vitals:  /98 (BP Location: Left arm, Patient Position: Sitting)   " "Pulse 87   Temp 97.6 °F (36.4 °C) (Oral)   Resp 20   Ht 157.5 cm (62\")   Wt 86.6 kg (190 lb 14.7 oz)   LMP 03/02/2022 (Exact Date)   SpO2 98%   BMI 34.92 kg/m²     Medications given:  Medications - No data to display    Procedures:    MDM: Patient is a 41-year-old female comes in complaining of elevated blood pressure and out of her home medication.  Patient has samples of her Viibryd with her however been 40 mg doses and instructions on package states to take half of this daily.  Patient denies any acute complaints and I had a long discussion with her regarding necessity of work-up versus ordering unnecessary tests and and shared decision making patient would like to forego any testing today and would like to be discharged.  I agreed with this plan as well.  Patient appears somewhat anxious on exam but I believe her to be of right mind and does not appear intoxicated or inebriated in any way and is alert and oriented times 3 out of 3.  Patient denies any SI, HI, chest pain, shortness of breath, syncope or any acute symptoms at this time.  Patient's blood pressure was found to be elevated in triage at 160s over 90s and was instructed to follow-up regarding high blood pressure with her family doctor and patient voiced understanding.  Patient given strict return precautions and voiced understanding. See full discharge instructions for further details.  Results and plan discussed with patient and is agreeable with plan.    Final diagnoses:   Elevated blood pressure reading   Anxiety       ED Disposition  ED Disposition     ED Disposition   Discharge    Condition   Stable    Comment   --             [facility] EMERGENCY DEPARTMENT  [address] [facility] [address] [phone]  Go in 1 day  As needed, If symptoms worsen    Brisa Edge, JEAN CARLOS  [address] [facility] [address] [place] [address] [phone]    Call in 1 week  As needed         Medication List      No changes were made to your " prescriptions during this visit.          Kyle Fernandez PA  03/08/22 4098

## 2022-03-14 ENCOUNTER — OFFICE VISIT (OUTPATIENT)
Dept: PAIN MEDICINE | Facility: CLINIC | Age: 42
End: 2022-03-14

## 2022-03-14 VITALS
HEIGHT: 62 IN | SYSTOLIC BLOOD PRESSURE: 162 MMHG | OXYGEN SATURATION: 99 % | DIASTOLIC BLOOD PRESSURE: 94 MMHG | BODY MASS INDEX: 34.96 KG/M2 | RESPIRATION RATE: 16 BRPM | HEART RATE: 98 BPM | WEIGHT: 190 LBS

## 2022-03-14 DIAGNOSIS — G89.29 CHRONIC LOW BACK PAIN WITH SCIATICA, SCIATICA LATERALITY UNSPECIFIED, UNSPECIFIED BACK PAIN LATERALITY: ICD-10-CM

## 2022-03-14 DIAGNOSIS — M54.40 CHRONIC LOW BACK PAIN WITH SCIATICA, SCIATICA LATERALITY UNSPECIFIED, UNSPECIFIED BACK PAIN LATERALITY: ICD-10-CM

## 2022-03-14 DIAGNOSIS — M79.7 FIBROMYALGIA: ICD-10-CM

## 2022-03-14 DIAGNOSIS — M53.3 SACROILIAC JOINT DYSFUNCTION: ICD-10-CM

## 2022-03-14 DIAGNOSIS — G89.4 CHRONIC PAIN SYNDROME: Primary | ICD-10-CM

## 2022-03-14 PROCEDURE — 99204 OFFICE O/P NEW MOD 45 MIN: CPT | Performed by: STUDENT IN AN ORGANIZED HEALTH CARE EDUCATION/TRAINING PROGRAM

## 2022-03-14 PROCEDURE — 99406 BEHAV CHNG SMOKING 3-10 MIN: CPT | Performed by: STUDENT IN AN ORGANIZED HEALTH CARE EDUCATION/TRAINING PROGRAM

## 2022-03-14 RX ORDER — FLUTICASONE PROPIONATE 50 MCG
1 SPRAY, SUSPENSION (ML) NASAL 2 TIMES DAILY
COMMUNITY
Start: 2022-02-03

## 2022-03-14 RX ORDER — METHYLPREDNISOLONE 4 MG/1
TABLET ORAL
COMMUNITY
Start: 2021-12-30 | End: 2022-08-23

## 2022-03-14 RX ORDER — ATOMOXETINE 40 MG/1
40 CAPSULE ORAL DAILY
COMMUNITY
Start: 2022-03-10 | End: 2023-02-07

## 2022-03-14 RX ORDER — TIZANIDINE 4 MG/1
4 TABLET ORAL 3 TIMES DAILY PRN
Qty: 90 TABLET | Refills: 0 | Status: SHIPPED | OUTPATIENT
Start: 2022-03-14 | End: 2022-04-13

## 2022-03-14 RX ORDER — CHOLECALCIFEROL (VITAMIN D3) 125 MCG
1 CAPSULE ORAL DAILY
COMMUNITY
Start: 2022-02-17 | End: 2023-02-07

## 2022-03-14 RX ORDER — GABAPENTIN 300 MG/1
300 CAPSULE ORAL 3 TIMES DAILY
COMMUNITY
Start: 2022-03-04 | End: 2023-02-07

## 2022-03-14 RX ORDER — MULTIVIT WITH MINERALS/LUTEIN
TABLET ORAL
COMMUNITY
Start: 2022-02-24

## 2022-03-14 RX ORDER — CELECOXIB 100 MG/1
100 CAPSULE ORAL
COMMUNITY

## 2022-07-18 ENCOUNTER — TELEPHONE (OUTPATIENT)
Dept: PAIN MEDICINE | Facility: CLINIC | Age: 42
End: 2022-07-18

## 2022-07-18 DIAGNOSIS — M79.7 FIBROMYALGIA: ICD-10-CM

## 2022-07-18 DIAGNOSIS — G89.29 CHRONIC LOW BACK PAIN WITH SCIATICA, SCIATICA LATERALITY UNSPECIFIED, UNSPECIFIED BACK PAIN LATERALITY: Primary | ICD-10-CM

## 2022-07-18 DIAGNOSIS — M54.40 CHRONIC LOW BACK PAIN WITH SCIATICA, SCIATICA LATERALITY UNSPECIFIED, UNSPECIFIED BACK PAIN LATERALITY: Primary | ICD-10-CM

## 2022-07-18 NOTE — TELEPHONE ENCOUNTER
Caller: ALENA    Relationship to patient: SELF    Best call back number: [phone]    Patient is needing: PATIENT WANTS NEW REFERRAL FOR PHYSICAL THERAPY AND FOR SPINE XRAY. UNSURE IF THEY

## 2022-08-31 ENCOUNTER — TELEPHONE (OUTPATIENT)
Dept: PAIN MEDICINE | Facility: CLINIC | Age: 42
End: 2022-08-31

## 2022-08-31 NOTE — TELEPHONE ENCOUNTER
Caller: PATIENT    Relationship to patient: SELF    Best call back number: [phone]    Chief complaint: CHRONIC PAIN    Type of visit: FOLLOW UP    Requested date: SOME TIME IN September         Additional notes: PT. HAD TO CANCEL HER LAST COUPLE OF APPTS.   THERE ARE NO OPENINGS SHOWING UNTIL October WITH DR. MABRY.  IS SCHEDULED BLOCKED?   PT. ASKING TO GET IN IN September.  PLEASE ADVISE.

## 2022-09-01 ENCOUNTER — HOSPITAL ENCOUNTER (OUTPATIENT)
Dept: GENERAL RADIOLOGY | Facility: HOSPITAL | Age: 42
Discharge: HOME | End: 2022-09-01
Admitting: STUDENT IN AN ORGANIZED HEALTH CARE EDUCATION/TRAINING PROGRAM

## 2022-09-01 DIAGNOSIS — M54.40 CHRONIC LOW BACK PAIN WITH SCIATICA, SCIATICA LATERALITY UNSPECIFIED, UNSPECIFIED BACK PAIN LATERALITY: ICD-10-CM

## 2022-09-01 DIAGNOSIS — G89.29 CHRONIC LOW BACK PAIN WITH SCIATICA, SCIATICA LATERALITY UNSPECIFIED, UNSPECIFIED BACK PAIN LATERALITY: ICD-10-CM

## 2022-09-01 PROCEDURE — 72100 X-RAY EXAM L-S SPINE 2/3 VWS: CPT

## 2022-09-07 NOTE — PROGRESS NOTES
Subjective   Debbie Estevez is a 42 y.o. female is here for follow up for lower back pain. Patient was last seen on 3/2022 about 6 months ago.  Canceled appointments and there was no follow-up since then as she was busy with her child.  She has not had any physical therapy since last visit. 1 session done today.      On last visit: Started tizanidine - it was helping with muscle spasms but made her too drowsy.    Lower back pain is 6/10 on VAS, maximum 7/10.  Pain is aching, shooting, stabbing in nature.  Pain is referred to left buttock, left posterior thigh, left knee aster.  Pain is constant.  Improved by nothing.  Worse with nothing in particular and weather changes.  She has severe muscle spasms in the back.      Previous Injection:     Hx: Referred by JEAN CARLOS Hay for  lower back pain.    Pain has been going on for some time but has been significantly worsened postpartum 3 years ago. Her lower back pain started about 3 months ago without any injuries. Pain was previously intermittent in past, but has been constant and worsened. Work-up from rheumatology was negative as per patient.  Notes from rheumatology Dr. Miguel Zamudio at [facility] reviewed.  Last seen on 2/23/2022.  It was noted that most of her pain is originating from fibromyalgia.  There was no suspicion for SLE.  Right knee effusion worse aspirated and was noninflammatory. She has been told that she needs surgery for carpal tunnel syndrome, but unable to do so due to small child. She is trying to loose weight, meditate, trying to quit smoking.       PHQ-9- 21  SOAPP- 6- CBD vaping in past.     PMH:   Bipolar, agoraphobia, fibromyalgia   Has seen psychiatrist NP     Current Medications:   Abilify  Pristiq  Nicotine patch  Vibryd      Past Medications:  Celebrex-did not work  Diclofenac-did not work  amitriptyline, Wellbutrin, Effexor, Trintellix, Zoloft, Cymbalta- helped (had bladder incontinenece), Xanax, BuSpar,Vraylar  Gabapentin - high  blood pressure? States that she was in hospital due to this     Past Modalities:  TENS:                                                                          no                                                  Physical Therapy Within The Last 6 Months              no  Psychotherapy                                                            no  Massage Therapy                                                       no     Patient Complains Of:  Uro-Fecal Incontinence          no  Weight Gain/Loss                   Yes (15 lbs in 3 month).   Fever/Chills                             no  Weakness                               no      Current Outpatient Medications:   •  albuterol sulfate  (90 Base) MCG/ACT inhaler, Inhale 2 puffs., Disp: , Rfl:   •  ascorbic acid (VITAMIN C) 1000 MG tablet, , Disp: , Rfl:   •  atomoxetine (STRATTERA) 40 MG capsule, Take 40 mg by mouth Daily., Disp: , Rfl:   •  celecoxib (CeleBREX) 100 MG capsule, Take 100 mg by mouth., Disp: , Rfl:   •  cholecalciferol (Cholecalciferol) 25 MCG (1000 UT) tablet, Take 1,000 Units by mouth Daily., Disp: , Rfl:   •  Cholecalciferol (Vitamin D3) 50 MCG (2000 UT) tablet, Take 1 tablet by mouth Daily., Disp: , Rfl:   •  cyanocobalamin (VITAMIN B-12) 1000 MCG tablet, Take 1 tablet by mouth Daily., Disp: , Rfl:   •  DULoxetine (CYMBALTA) 30 MG capsule, Take 1 capsule by mouth Daily., Disp: 30 capsule, Rfl: 5  •  fluticasone (FLONASE) 50 MCG/ACT nasal spray, 1 spray 2 (Two) Times a Day. shake liquid, Disp: , Rfl:   •  gabapentin (NEURONTIN) 300 MG capsule, Take 300 mg by mouth 3 (Three) Times a Day., Disp: , Rfl:   •  hydrOXYzine (ATARAX) 50 MG tablet, Take 50 mg by mouth., Disp: , Rfl:   •  lamoTRIgine (LaMICtal) 100 MG tablet, Take 100 mg by mouth Daily., Disp: , Rfl:   •  lisinopril (PRINIVIL,ZESTRIL) 10 MG tablet, Take 10 mg by mouth Daily., Disp: , Rfl:   •  melatonin 3 MG tablet, Take 6 mg by mouth Daily., Disp: , Rfl:   •  Omega-3 Fatty Acids (FISH  OIL) 1000 MG capsule capsule, Take 1,000 mg by mouth Daily., Disp: , Rfl:   •  omeprazole (priLOSEC) 20 MG capsule, Take 1 capsule by mouth Daily for 30 days., Disp: 30 capsule, Rfl: 2  •  ondansetron (ZOFRAN) 4 MG tablet, Take 4 mg by mouth Every 8 (Eight) Hours As Needed for Nausea or Vomiting., Disp: , Rfl:   •  prenatal vitamins (PRENATAL 27-1) 27-1 MG tablet tablet, Take 1 tablet by mouth Daily., Disp: , Rfl:   •  promethazine (PHENERGAN) 25 MG tablet, Take 1 tablet by mouth Every 6 (Six) Hours As Needed for Nausea or Vomiting., Disp: 10 tablet, Rfl: 0  •  PROPRANOLOL HCL PO, Take  by mouth., Disp: , Rfl:   •  saccharomyces boulardii (FLORASTOR) 250 MG capsule, Take 1 capsule by mouth Daily., Disp: 90 capsule, Rfl: 3  •  sucralfate (CARAFATE) 1 g tablet, Take 1 tablet by mouth 4 (Four) Times a Day., Disp: 40 tablet, Rfl: 0  •  Vortioxetine HBr (TRINTELLIX) 10 MG tablet, Take 10 mg by mouth Daily., Disp: 30 tablet, Rfl: 5  •  tiZANidine (ZANAFLEX) 4 MG tablet, Take 0.5 tablets by mouth At Night As Needed for Muscle Spasms for up to 60 days., Disp: 30 tablet, Rfl: 0    The following portions of the patient's history were reviewed and updated as appropriate: allergies, current medications, past family history, past medical history, past social history, past surgical history, and problem list.      REVIEW OF PERTINENT MEDICAL DATA    Past Medical History:   Diagnosis Date   • Allergic    • Anxiety    • Asthma    • Depression    • Headache    • Hearing loss    • Low back pain    • Pneumonia    • Urinary tract infection      Past Surgical History:   Procedure Laterality Date   •  SECTION     • KNEE SURGERY       Family History   Problem Relation Age of Onset   • Anxiety disorder Mother    • Arthritis Mother    • COPD Mother    • Depression Mother    • Diabetes Mother    • Hypertension Mother    • Thyroid disease Mother    • Vision loss Mother    • Alcohol abuse Father    • Anxiety disorder Maternal Aunt    •  Anxiety disorder Maternal Grandmother    • Arthritis Maternal Grandmother    • COPD Maternal Grandmother    • Diabetes Maternal Grandmother    • Heart disease Maternal Grandmother    • Hyperlipidemia Maternal Grandmother    • Hypertension Maternal Grandmother    • Miscarriages / Stillbirths Maternal Grandmother    • Thyroid disease Maternal Grandmother    • Vision loss Maternal Grandmother    • Alcohol abuse Maternal Grandfather    • Alcohol abuse Paternal Grandfather      Social History     Socioeconomic History   • Marital status: Single   Tobacco Use   • Smoking status: Current Every Day Smoker     Packs/day: 0.25     Types: Cigarettes   • Smokeless tobacco: Never Used   Vaping Use   • Vaping Use: Some days   • Substances: Nicotine   Substance and Sexual Activity   • Alcohol use: Not Currently   • Drug use: Not Currently   • Sexual activity: Yes     Partners: Male         Review of Systems      Vitals:    09/08/22 1316   BP: 171/85   Pulse: (!) 122   Resp: 16   SpO2: 98%   PainSc:   5         Objective   Physical Exam  Musculoskeletal:         General: Tenderness present.        Legs:    Neurological:      Deep Tendon Reflexes:      Reflex Scores:       Patellar reflexes are 2+ on the right side and 2+ on the left side.       Achilles reflexes are 2+ on the right side and 2+ on the left side.     Comments: Motor strength 5/5 b/l LE  Sensory intact b/l LE             Imaging Reviewed:  Lumbar x-ray-9/1/2022  - Negative for any acute osseous abnormality   -Disc spaces are maintained    Assessment:    1. Chronic low back pain with sciatica, sciatica laterality unspecified, unspecified back pain laterality    2. Fibromyalgia    3. Chronic pain syndrome    4. Sacroiliac joint dysfunction         Plan:   1. UDS Deferred for now.   2. We discussed trying a course of formal physical therapy. Physical therapy can help strengthen and stretch the muscles around the joints. Continue to be as active as possible. Start  physical therapy as it will help generalized pain and follow up with HEP. Prescription sent- 3/14/22-scheduled for first session on 9/8/2022  3. Restart Tizanidine 2 mg qhs for muscle spasms. Common side effects discussed with the patient including but not limited to dry mouth, drowsiness, dizziness, lightheadedness, constipation, weakness and tiredness.   4. Discussed starting Lyrica, but she is not interested as she had bad side effects with Gabapentin. She has failed Cymbalta  5. She has failed multiples NSAIDs.  6. Patient states that she had opioids when she had accident in past and worked really well for her and why can't I just prescribe opioids for her. Discussed with patient that opioids are not indicated for long term, they can worsen fibromyalgia pain and not indicated in her case. Patient is tearful during the visit. Recommended finishing 6 weeks of physical therapy and if her pain doesn't improve, we will consider SI joint injection on left side.      RTC after PT.     Babak Delatorre DO  Pain Management   [facility]            INSPECT REPORT    As part of the patient's treatment plan, I may be prescribing controlled substances. The patient has been made aware of appropriate use of such medications, including potential risk of somnolence, limited ability to drive and/or work safely, and the potential for dependence or overdose. It has also been made clear that these medications are for use by this patient only, without concomitant use of alcohol or other substances unless prescribed.     Patient has completed prescribing agreement detailing terms of continued prescribing of controlled substances, including monitoring INSPECT reports, urine drug screening, and pill counts if necessary. The patient is aware that inappropriate use will results in cessation of prescribing such medications.    INSPECT report has been reviewed and scanned into the patient's chart.

## 2022-09-08 ENCOUNTER — OFFICE VISIT (OUTPATIENT)
Dept: PAIN MEDICINE | Facility: CLINIC | Age: 42
End: 2022-09-08

## 2022-09-08 ENCOUNTER — TREATMENT (OUTPATIENT)
Dept: PHYSICAL THERAPY | Facility: CLINIC | Age: 42
End: 2022-09-08

## 2022-09-08 VITALS
HEART RATE: 122 BPM | DIASTOLIC BLOOD PRESSURE: 85 MMHG | OXYGEN SATURATION: 98 % | RESPIRATION RATE: 16 BRPM | SYSTOLIC BLOOD PRESSURE: 171 MMHG

## 2022-09-08 DIAGNOSIS — M79.7 FIBROMYALGIA: ICD-10-CM

## 2022-09-08 DIAGNOSIS — G89.4 CHRONIC PAIN SYNDROME: ICD-10-CM

## 2022-09-08 DIAGNOSIS — M54.40 CHRONIC LOW BACK PAIN WITH SCIATICA, SCIATICA LATERALITY UNSPECIFIED, UNSPECIFIED BACK PAIN LATERALITY: Primary | ICD-10-CM

## 2022-09-08 DIAGNOSIS — M53.3 SACROILIAC JOINT DYSFUNCTION: ICD-10-CM

## 2022-09-08 DIAGNOSIS — G89.29 CHRONIC LOW BACK PAIN WITH SCIATICA, SCIATICA LATERALITY UNSPECIFIED, UNSPECIFIED BACK PAIN LATERALITY: Primary | ICD-10-CM

## 2022-09-08 PROCEDURE — 99214 OFFICE O/P EST MOD 30 MIN: CPT | Performed by: STUDENT IN AN ORGANIZED HEALTH CARE EDUCATION/TRAINING PROGRAM

## 2022-09-08 PROCEDURE — 97162 PT EVAL MOD COMPLEX 30 MIN: CPT | Performed by: PHYSICAL THERAPIST

## 2022-09-08 RX ORDER — TIZANIDINE 4 MG/1
2 TABLET ORAL NIGHTLY PRN
Qty: 30 TABLET | Refills: 0 | Status: SHIPPED | OUTPATIENT
Start: 2022-09-08 | End: 2022-11-07

## 2022-09-08 NOTE — PROGRESS NOTES
Physical Therapy Initial Evaluation and Plan of Care    Patient: Debbie Estevez   : 1980  Diagnosis/ICD-10 Code:  Chronic low back pain with sciatica, sciatica laterality unspecified, unspecified back pain laterality [M54.40, G89.29]  Referring practitioner: Babak Delatorre DO  Date of Initial Visit: 2022  Today's Date: 2022  Patient seen for 1 sessions           Subjective Questionnaire: Oswestry: 76%      Subjective Evaluation    History of Present Illness  Mechanism of injury: Pt is referred to therapy due to chronic LBP. Reports all her issues started after she gave bith to her son in 2019. She has been Dx with Lupus, fibromyalgia, anxiety, CTS. She has pain all over her body, neck/ back, L hip, R knee, her L hip locks up,  achilles tendonitis, migraine HA. Has ms spasms in her neck/ back, and her thighs. She has having a lot of anxiety.     Onset of symptoms :     Aggravating factors: worse in the mornings, activities, walking, hiking, house cleaning, shopping any type of activity    Relieving factors: cold showers, takes a ice bath once a month, meditation    Functional limitation: normal activities, taking walks, playing with her 3 y/o son, shopping, cleaning her house, standing at the sink, laundry    She has had shots in both wrists, and medication for pain management.  She has an appt with pain management today.           Patient Occupation: unemployed Quality of life: fair    Pain  Current pain ratin  At best pain rating: 3  At worst pain ratin  Progression: worsening    Social Support  Lives with: young children    Patient Goals  Patient goals for therapy: decreased pain, increased motion, increased strength and return to sport/leisure activities           Precautions:     Objective          Postural Observations  Seated posture: fair  Standing posture: fair  Correction of posture: has no consistent effect        Palpation   Left   Tenderness of the lumbar paraspinals.     Right  Tenderness of the lumbar paraspinals.     Additional Palpation Details  L buttocks    Active Range of Motion     Additional Active Range of Motion Details  Standing lumbar flex: wfl , inc pain in LB, she takes deep breaths and holds her breath, reports it helps her to get her mind off of what she is doing    Standing lumbar ext: mod limitation , inc pain across LB    Supine flex: wfl , inc LBP, increased with reps    Prone ext: mod limitation, inc pain across LB, worse with reps    SLR: L positive     Flexibility: mod tightness B HS    Manual Traction:  Dec pain in LBP during , remained better after          Assessment & Plan     Assessment  Impairments: abnormal or restricted ROM, activity intolerance, lacks appropriate home exercise program, pain with function and weight-bearing intolerance  Functional Limitations: lifting, sleeping, walking, pulling, pushing, uncomfortable because of pain, sitting and standing  Assessment details: Pt is a 42 y.o. female referred to therapy due to chronic LBP. Pt presents with hx of fibromyalgia, Lupus, chronic pain .  Pt presents with impaired spinal ROM, dec octavio to physical activities, dec tolerance to performing normal / daily activities.   Upon initial evaluation pt exhibits the above impairments and functional limitations. Impairments affect performing her house work, stranding long enough to do dishes, cook, laundry, unable to go for walks and play with her son.   Pt is a good candidate for rehab and will benefit from skilled physical therapy to address impairments, resolve pain and maximize function.    Prognosis: good    Goals  Plan Goals: STGs:  In 4 weeks  1- Pt will  report at least 25 % improvement in functional mobility and pain reduction   2- Pt will be independent with initial HEP   3- Pt will tolerate progression of her exercise program and HEP without increase symptoms      LTGs: By DC   1- Pt will report at least 50 % improvement in functional mobility and pain  reduction   2- Pt will be independent with final HEP and self management of her condition   3- Oswestry score to be 50%  indicating functional improvement , pain and symptom reduction  4- Pt will voice readiness for DC with independent program   5- Pt will be able to perform her daily activities and house work with less pain     Plan  Therapy options: will be seen for skilled therapy services  Planned modality interventions: high voltage pulsed current (pain management), ultrasound and traction  Planned therapy interventions: abdominal trunk stabilization, balance/weight-bearing training, body mechanics training, functional ROM exercises, home exercise program, manual therapy, neuromuscular re-education, postural training, strengthening and therapeutic activities  Frequency: 2x week  Duration in weeks: 12  Treatment plan discussed with: patient        See flow sheet for treatment detail    History # of Personal Factors and/or Comorbidities: MODERATE (1-2)  Examination of Body System(s): # of elements: MODERATE (3)  Clinical Presentation: EVOLVING  Clinical Decision Making: MODERATE          Timed:         Manual Therapy:         mins  40170;     Therapeutic Exercise:         mins  77211;     Neuromuscular Velma:        mins  95623;    Therapeutic Activity:          mins  41970;     Gait Training:           mins  88293;     Ultrasound:          mins  20686;    Ionto                                   mins   70589  Self Care                            mins   51215  Canal repositioning           mins    08083      Un-Timed:  Electrical Stimulation:         mins  61934 ( );  Dry Needling          mins self-pay  Traction          mins 59005  Low Eval          Mins  03237  Mod Eval     45     Mins  70790  High Eval                            Mins  35649  Re-Eval                               mins  94676        Timed Treatment:      mins   Total Treatment:    45    mins    PT SIGNATURE: Emmett Baltazar, PT, CLT  [place]  License: # [id number]     DATE TREATMENT INITIATED: 9/8/2022    Initial Certification  Certification Period: 9/8/2022 thru 12/6/2022  I certify that the therapy services are furnished while this patient is under my care.  The services outlined above are required by this patient, and will be reviewed every 90 days.     PHYSICIAN: Babak Delatorre DO   NPI: [id number]                                      DATE:     Please sign and return via fax to [phone].. Thank you, [facility].

## 2022-09-13 ENCOUNTER — TREATMENT (OUTPATIENT)
Dept: PHYSICAL THERAPY | Facility: CLINIC | Age: 42
End: 2022-09-13

## 2022-09-13 DIAGNOSIS — G89.29 CHRONIC LOW BACK PAIN WITH SCIATICA, SCIATICA LATERALITY UNSPECIFIED, UNSPECIFIED BACK PAIN LATERALITY: Primary | ICD-10-CM

## 2022-09-13 DIAGNOSIS — M54.40 CHRONIC LOW BACK PAIN WITH SCIATICA, SCIATICA LATERALITY UNSPECIFIED, UNSPECIFIED BACK PAIN LATERALITY: Primary | ICD-10-CM

## 2022-09-13 DIAGNOSIS — M79.7 FIBROMYALGIA: ICD-10-CM

## 2022-09-13 PROCEDURE — 97112 NEUROMUSCULAR REEDUCATION: CPT | Performed by: PHYSICAL THERAPIST

## 2022-09-13 PROCEDURE — 97140 MANUAL THERAPY 1/> REGIONS: CPT | Performed by: PHYSICAL THERAPIST

## 2022-09-13 PROCEDURE — 97110 THERAPEUTIC EXERCISES: CPT | Performed by: PHYSICAL THERAPIST

## 2022-09-13 NOTE — PROGRESS NOTES
Physical Therapy Daily Treatment Note      Patient: Debbie Estevez   : 1980  Diagnosis/ICD-10 Code:  Chronic low back pain with sciatica, sciatica laterality unspecified, unspecified back pain laterality [M54.40, G89.29]  Referring practitioner: No ref. provider found.  Dr. Delatorre  Date of Initial Visit: 2022  Today's Date: 2022  Patient seen for 2 sessions.  POC 2x/2k x 12 weeks, exp 22  Insurance auth pending    PRECAUTIONS:/PMH: Lupus, fibromyalgia, anxiety, CTS.      VISIT#: 2      Subjective :  Pain 3/10 center low back and up into L thoracic and into left shoulder blade.  Patient is using ice baths, yoga, meditation and Anatoly mat to assist with pain.     Objective     See Exercise, Manual, and Modality Logs for complete treatment.  Initiated there ex and manual distraction.       Patient Education:  HEP issued.  See chart. She was also given pamphlet on proper body mechanics.     Exercises  Supine Hip Adduction Isometric with Ball - 1 x daily - 7 x weekly - 2 sets - 10 reps - 5 seconds hold  Hooklying Isometric Clamshell - 1 x daily - 7 x weekly - 2 sets - 10 reps - 5 seconds hold  Supine Posterior Pelvic Tilt - 2 x daily - 7 x weekly - 2 sets - 10 reps - 5 seconds hold       Assessment/Plan:  Pt. Requires constant cueing to avoid overdoing activities and holding breath.  Good response to manual lumbar distraction with decreased pain afterward.       Progress per Plan of Care:  Initiated mechanical lumbar traction.             Timed:         Manual Therapy:   10      mins  41196;     Therapeutic Exercise:     20    mins  89533;     Neuromuscular Velma:  10      mins  93502;    Therapeutic Activity:          mins  44838;     Gait Training:           mins  45371;     Ultrasound:          mins  96168;    Ionto                                   mins   13927  Self Care                            mins   55302  Aquatic                               mins 66588    Un-Timed:  Electrical  Stimulation:         mins  29755 ( );  Traction          mins 28956      Timed Treatment:  40    mins   Total Treatment:   40     mins    Paloma Jane PTA  Physical Therapist Assistant   [place] license:  #[id number]

## 2022-09-20 ENCOUNTER — TREATMENT (OUTPATIENT)
Dept: PHYSICAL THERAPY | Facility: CLINIC | Age: 42
End: 2022-09-20

## 2022-09-20 DIAGNOSIS — M54.40 CHRONIC LOW BACK PAIN WITH SCIATICA, SCIATICA LATERALITY UNSPECIFIED, UNSPECIFIED BACK PAIN LATERALITY: Primary | ICD-10-CM

## 2022-09-20 DIAGNOSIS — G89.29 CHRONIC LOW BACK PAIN WITH SCIATICA, SCIATICA LATERALITY UNSPECIFIED, UNSPECIFIED BACK PAIN LATERALITY: Primary | ICD-10-CM

## 2022-09-20 PROCEDURE — 97112 NEUROMUSCULAR REEDUCATION: CPT | Performed by: PHYSICAL THERAPIST

## 2022-09-20 PROCEDURE — 97110 THERAPEUTIC EXERCISES: CPT | Performed by: PHYSICAL THERAPIST

## 2022-09-20 PROCEDURE — 97012 MECHANICAL TRACTION THERAPY: CPT | Performed by: PHYSICAL THERAPIST

## 2024-08-13 ENCOUNTER — TRANSCRIBE ORDERS (OUTPATIENT)
Dept: ADMINISTRATIVE | Facility: HOSPITAL | Age: 44
End: 2024-08-13
Payer: MEDICAID

## 2024-08-13 DIAGNOSIS — Z12.31 BREAST CANCER SCREENING BY MAMMOGRAM: Primary | ICD-10-CM

## 2024-08-26 ENCOUNTER — HOSPITAL ENCOUNTER (OUTPATIENT)
Dept: MAMMOGRAPHY | Facility: HOSPITAL | Age: 44
Discharge: HOME | End: 2024-08-26
Admitting: NURSE PRACTITIONER
Payer: MEDICAID

## 2024-08-26 DIAGNOSIS — Z12.31 BREAST CANCER SCREENING BY MAMMOGRAM: ICD-10-CM

## 2024-08-26 PROCEDURE — 77067 SCR MAMMO BI INCL CAD: CPT

## 2024-08-26 PROCEDURE — 77063 BREAST TOMOSYNTHESIS BI: CPT

## 2024-09-13 ENCOUNTER — HOSPITAL ENCOUNTER (OUTPATIENT)
Dept: MAMMOGRAPHY | Facility: HOSPITAL | Age: 44
Discharge: HOME | End: 2024-09-13
Payer: MEDICAID

## 2024-09-13 ENCOUNTER — HOSPITAL ENCOUNTER (OUTPATIENT)
Dept: ULTRASOUND IMAGING | Facility: HOSPITAL | Age: 44
Discharge: HOME | End: 2024-09-13
Payer: MEDICAID

## 2024-09-13 DIAGNOSIS — N64.89 BREAST ASYMMETRY: ICD-10-CM

## 2024-09-13 PROCEDURE — G0279 TOMOSYNTHESIS, MAMMO: HCPCS

## 2024-09-13 PROCEDURE — 77065 DX MAMMO INCL CAD UNI: CPT

## 2025-02-20 ENCOUNTER — HOSPITAL ENCOUNTER (EMERGENCY)
Facility: HOSPITAL | Age: 45
Discharge: OTHER INSTITUTION | End: 2025-02-21
Attending: EMERGENCY MEDICINE | Admitting: STUDENT IN AN ORGANIZED HEALTH CARE EDUCATION/TRAINING PROGRAM
Payer: MEDICAID

## 2025-02-20 ENCOUNTER — APPOINTMENT (OUTPATIENT)
Dept: CT IMAGING | Facility: HOSPITAL | Age: 45
End: 2025-02-20
Payer: MEDICAID

## 2025-02-20 DIAGNOSIS — R10.9 ABDOMINAL PAIN, UNSPECIFIED ABDOMINAL LOCATION: Primary | ICD-10-CM

## 2025-02-20 DIAGNOSIS — R19.00 ABDOMINAL MASS, UNSPECIFIED ABDOMINAL LOCATION: ICD-10-CM

## 2025-02-20 DIAGNOSIS — K52.9 COLITIS: ICD-10-CM

## 2025-02-20 LAB
ALBUMIN SERPL-MCNC: 4.1 G/DL (ref 3.5–5.2)
ALBUMIN/GLOB SERPL: 1.7 G/DL
ALP SERPL-CCNC: 86 U/L (ref 39–117)
ALT SERPL W P-5'-P-CCNC: 15 U/L (ref 1–33)
ANION GAP SERPL CALCULATED.3IONS-SCNC: 10.5 MMOL/L (ref 5–15)
AST SERPL-CCNC: 20 U/L (ref 1–32)
B-HCG UR QL: NEGATIVE
BASOPHILS # BLD AUTO: 0.05 10*3/MM3 (ref 0–0.2)
BASOPHILS NFR BLD AUTO: 0.3 % (ref 0–1.5)
BILIRUB SERPL-MCNC: 0.4 MG/DL (ref 0–1.2)
BILIRUB UR QL STRIP: NEGATIVE
BUN SERPL-MCNC: 7 MG/DL (ref 6–20)
BUN/CREAT SERPL: 7.8 (ref 7–25)
CALCIUM SPEC-SCNC: 8.9 MG/DL (ref 8.6–10.5)
CHLORIDE SERPL-SCNC: 102 MMOL/L (ref 98–107)
CLARITY UR: CLEAR
CO2 SERPL-SCNC: 22.5 MMOL/L (ref 22–29)
COLOR UR: YELLOW
CREAT SERPL-MCNC: 0.9 MG/DL (ref 0.57–1)
D-LACTATE SERPL-SCNC: 0.5 MMOL/L (ref 0.3–2)
DEPRECATED RDW RBC AUTO: 46.9 FL (ref 37–54)
EGFRCR SERPLBLD CKD-EPI 2021: 81.5 ML/MIN/1.73
EOSINOPHIL # BLD AUTO: 0.39 10*3/MM3 (ref 0–0.4)
EOSINOPHIL NFR BLD AUTO: 2.3 % (ref 0.3–6.2)
ERYTHROCYTE [DISTWIDTH] IN BLOOD BY AUTOMATED COUNT: 15 % (ref 12.3–15.4)
GLOBULIN UR ELPH-MCNC: 2.4 GM/DL
GLUCOSE SERPL-MCNC: 119 MG/DL (ref 65–99)
GLUCOSE UR STRIP-MCNC: NEGATIVE MG/DL
HCT VFR BLD AUTO: 36.1 % (ref 34–46.6)
HGB BLD-MCNC: 11.6 G/DL (ref 12–15.9)
HGB UR QL STRIP.AUTO: NEGATIVE
IMM GRANULOCYTES # BLD AUTO: 0.07 10*3/MM3 (ref 0–0.05)
IMM GRANULOCYTES NFR BLD AUTO: 0.4 % (ref 0–0.5)
KETONES UR QL STRIP: ABNORMAL
LEUKOCYTE ESTERASE UR QL STRIP.AUTO: NEGATIVE
LIPASE SERPL-CCNC: 24 U/L (ref 13–60)
LYMPHOCYTES # BLD AUTO: 2.23 10*3/MM3 (ref 0.7–3.1)
LYMPHOCYTES NFR BLD AUTO: 12.9 % (ref 19.6–45.3)
MCH RBC QN AUTO: 27.4 PG (ref 26.6–33)
MCHC RBC AUTO-ENTMCNC: 32.1 G/DL (ref 31.5–35.7)
MCV RBC AUTO: 85.1 FL (ref 79–97)
MONOCYTES # BLD AUTO: 1.5 10*3/MM3 (ref 0.1–0.9)
MONOCYTES NFR BLD AUTO: 8.7 % (ref 5–12)
NEUTROPHILS NFR BLD AUTO: 13.08 10*3/MM3 (ref 1.7–7)
NEUTROPHILS NFR BLD AUTO: 75.4 % (ref 42.7–76)
NITRITE UR QL STRIP: NEGATIVE
NRBC BLD AUTO-RTO: 0 /100 WBC (ref 0–0.2)
PH UR STRIP.AUTO: 5.5 [PH] (ref 5–8)
PLATELET # BLD AUTO: 331 10*3/MM3 (ref 140–450)
PMV BLD AUTO: 9.6 FL (ref 6–12)
POTASSIUM SERPL-SCNC: 3.9 MMOL/L (ref 3.5–5.2)
PROT SERPL-MCNC: 6.5 G/DL (ref 6–8.5)
PROT UR QL STRIP: NEGATIVE
RBC # BLD AUTO: 4.24 10*6/MM3 (ref 3.77–5.28)
SODIUM SERPL-SCNC: 135 MMOL/L (ref 136–145)
SP GR UR STRIP: 1.02 (ref 1–1.03)
UROBILINOGEN UR QL STRIP: ABNORMAL
WBC NRBC COR # BLD AUTO: 17.32 10*3/MM3 (ref 3.4–10.8)

## 2025-02-20 PROCEDURE — 25510000001 IOPAMIDOL PER 1 ML: Performed by: EMERGENCY MEDICINE

## 2025-02-20 PROCEDURE — 25010000002 CEFTRIAXONE PER 250 MG: Performed by: EMERGENCY MEDICINE

## 2025-02-20 PROCEDURE — 99285 EMERGENCY DEPT VISIT HI MDM: CPT

## 2025-02-20 PROCEDURE — 36415 COLL VENOUS BLD VENIPUNCTURE: CPT

## 2025-02-20 PROCEDURE — 83605 ASSAY OF LACTIC ACID: CPT

## 2025-02-20 PROCEDURE — 25010000002 METRONIDAZOLE 500 MG/100ML SOLUTION: Performed by: EMERGENCY MEDICINE

## 2025-02-20 PROCEDURE — 87040 BLOOD CULTURE FOR BACTERIA: CPT | Performed by: EMERGENCY MEDICINE

## 2025-02-20 PROCEDURE — 96365 THER/PROPH/DIAG IV INF INIT: CPT

## 2025-02-20 PROCEDURE — 96368 THER/DIAG CONCURRENT INF: CPT

## 2025-02-20 PROCEDURE — 85025 COMPLETE CBC W/AUTO DIFF WBC: CPT | Performed by: EMERGENCY MEDICINE

## 2025-02-20 PROCEDURE — 83690 ASSAY OF LIPASE: CPT | Performed by: EMERGENCY MEDICINE

## 2025-02-20 PROCEDURE — 81025 URINE PREGNANCY TEST: CPT | Performed by: EMERGENCY MEDICINE

## 2025-02-20 PROCEDURE — 80053 COMPREHEN METABOLIC PANEL: CPT | Performed by: EMERGENCY MEDICINE

## 2025-02-20 PROCEDURE — 81003 URINALYSIS AUTO W/O SCOPE: CPT | Performed by: EMERGENCY MEDICINE

## 2025-02-20 PROCEDURE — 74177 CT ABD & PELVIS W/CONTRAST: CPT

## 2025-02-20 RX ORDER — SODIUM CHLORIDE 0.9 % (FLUSH) 0.9 %
10 SYRINGE (ML) INJECTION AS NEEDED
Status: DISCONTINUED | OUTPATIENT
Start: 2025-02-20 | End: 2025-02-21 | Stop reason: HOSPADM

## 2025-02-20 RX ORDER — SODIUM CHLORIDE 9 MG/ML
40 INJECTION, SOLUTION INTRAVENOUS AS NEEDED
Status: DISCONTINUED | OUTPATIENT
Start: 2025-02-20 | End: 2025-02-21 | Stop reason: HOSPADM

## 2025-02-20 RX ORDER — METRONIDAZOLE 500 MG/100ML
500 INJECTION, SOLUTION INTRAVENOUS ONCE
Status: COMPLETED | OUTPATIENT
Start: 2025-02-20 | End: 2025-02-21

## 2025-02-20 RX ORDER — SODIUM CHLORIDE 0.9 % (FLUSH) 0.9 %
10 SYRINGE (ML) INJECTION EVERY 12 HOURS SCHEDULED
Status: DISCONTINUED | OUTPATIENT
Start: 2025-02-20 | End: 2025-02-21 | Stop reason: HOSPADM

## 2025-02-20 RX ORDER — IOPAMIDOL 755 MG/ML
100 INJECTION, SOLUTION INTRAVASCULAR
Status: COMPLETED | OUTPATIENT
Start: 2025-02-20 | End: 2025-02-20

## 2025-02-20 RX ADMIN — METRONIDAZOLE 500 MG: 500 INJECTION, SOLUTION INTRAVENOUS at 22:55

## 2025-02-20 RX ADMIN — IOPAMIDOL 100 ML: 755 INJECTION, SOLUTION INTRAVENOUS at 21:20

## 2025-02-20 RX ADMIN — CEFTRIAXONE SODIUM 1000 MG: 1 INJECTION, POWDER, FOR SOLUTION INTRAMUSCULAR; INTRAVENOUS at 22:55

## 2025-02-20 NOTE — Clinical Note
Level of Care: Med/Surg [1]   Diagnosis: Abdominal pain [450748]   Admitting Physician: JT MONTIEL [[id number]]   Certification: I Certify That Inpatient Hospital Services Are Medically Necessary For Greater Than 2 Midnights

## 2025-02-21 VITALS
DIASTOLIC BLOOD PRESSURE: 57 MMHG | BODY MASS INDEX: 32.01 KG/M2 | HEIGHT: 62 IN | TEMPERATURE: 99.6 F | OXYGEN SATURATION: 96 % | HEART RATE: 92 BPM | SYSTOLIC BLOOD PRESSURE: 101 MMHG | RESPIRATION RATE: 16 BRPM

## 2025-02-21 RX ADMIN — Medication 10 ML: at 00:21

## 2025-02-21 NOTE — ED PROVIDER NOTES
Subjective   History of Present Illness  Chief complaint: Rectal bleeding    44-year-old female presents with rectal bleeding.  Patient states she has had intermittent abdominal pain over the past week.  She took some laxatives yesterday because of some hard bowel movements.  She states after that she had a lot of cramping and then had some forceful bowel movements.  Today she had 3 episodes of bloody bowel movements.  She states her pain is improved.  She reported fever but states that it only got up to 99.0.  She has had no vomiting.  She has a gastroenterologist and has an appointment in 5 days.    History provided by:  Patient      Review of Systems   Constitutional:  Negative for fever.   HENT:  Negative for congestion.    Respiratory:  Negative for cough and shortness of breath.    Cardiovascular:  Negative for chest pain.   Gastrointestinal:  Positive for abdominal pain and blood in stool. Negative for nausea and vomiting.   Musculoskeletal:  Negative for back pain.   Neurological:  Negative for headaches.   Psychiatric/Behavioral:  Negative for confusion.        Past Medical History:   Diagnosis Date    Allergic     Anxiety     Asthma     Depression     Headache     Hearing loss     Low back pain     Pneumonia     Urinary tract infection        Allergies   Allergen Reactions    Latex Itching and Other (See Comments)     Burning        Past Surgical History:   Procedure Laterality Date     SECTION      KNEE SURGERY         Family History   Problem Relation Age of Onset    Anxiety disorder Mother     Arthritis Mother     COPD Mother     Depression Mother     Diabetes Mother     Hypertension Mother     Thyroid disease Mother     Vision loss Mother     Alcohol abuse Father     Anxiety disorder Maternal Aunt     Anxiety disorder Maternal Grandmother     Arthritis Maternal Grandmother     COPD Maternal Grandmother     Diabetes Maternal Grandmother     Heart disease Maternal Grandmother     Hyperlipidemia  "Maternal Grandmother     Hypertension Maternal Grandmother     Miscarriages / Stillbirths Maternal Grandmother     Thyroid disease Maternal Grandmother     Vision loss Maternal Grandmother     Alcohol abuse Maternal Grandfather     Alcohol abuse Paternal Grandfather        Social History     Socioeconomic History    Marital status: Single   Tobacco Use    Smoking status: Every Day     Current packs/day: 0.25     Types: Cigarettes    Smokeless tobacco: Never   Vaping Use    Vaping status: Former    Substances: Nicotine   Substance and Sexual Activity    Alcohol use: Not Currently    Drug use: Not Currently    Sexual activity: Yes     Partners: Male       BP (!) 155/101 (BP Location: Left arm, Patient Position: Lying)   Pulse 93   Temp 98.3 °F (36.8 °C) (Oral)   Resp 15   Ht 157.5 cm (62\")   SpO2 98%   BMI 32.01 kg/m²       Objective   Physical Exam  Vitals and nursing note reviewed.   Constitutional:       Appearance: Normal appearance.   HENT:      Head: Normocephalic and atraumatic.      Mouth/Throat:      Mouth: Mucous membranes are moist.   Cardiovascular:      Rate and Rhythm: Normal rate and regular rhythm.      Heart sounds: Normal heart sounds.   Pulmonary:      Effort: Pulmonary effort is normal. No respiratory distress.      Breath sounds: Normal breath sounds.   Abdominal:      General: Bowel sounds are normal.      Palpations: Abdomen is soft.      Tenderness: There is abdominal tenderness in the periumbilical area. There is no guarding or rebound.   Skin:     General: Skin is warm and dry.   Neurological:      Mental Status: She is alert and oriented to person, place, and time.         Procedures           ED Course      Results for orders placed or performed during the hospital encounter of 02/20/25   Comprehensive Metabolic Panel    Collection Time: 02/20/25  8:39 PM    Specimen: Blood   Result Value Ref Range    Glucose 119 (H) 65 - 99 mg/dL    BUN 7 6 - 20 mg/dL    Creatinine 0.90 0.57 - 1.00 " mg/dL    Sodium 135 (L) 136 - 145 mmol/L    Potassium 3.9 3.5 - 5.2 mmol/L    Chloride 102 98 - 107 mmol/L    CO2 22.5 22.0 - 29.0 mmol/L    Calcium 8.9 8.6 - 10.5 mg/dL    Total Protein 6.5 6.0 - 8.5 g/dL    Albumin 4.1 3.5 - 5.2 g/dL    ALT (SGPT) 15 1 - 33 U/L    AST (SGOT) 20 1 - 32 U/L    Alkaline Phosphatase 86 39 - 117 U/L    Total Bilirubin 0.4 0.0 - 1.2 mg/dL    Globulin 2.4 gm/dL    A/G Ratio 1.7 g/dL    BUN/Creatinine Ratio 7.8 7.0 - 25.0    Anion Gap 10.5 5.0 - 15.0 mmol/L    eGFR 81.5 >60.0 mL/min/1.73   Lipase    Collection Time: 02/20/25  8:39 PM    Specimen: Blood   Result Value Ref Range    Lipase 24 13 - 60 U/L   CBC Auto Differential    Collection Time: 02/20/25  8:39 PM    Specimen: Blood   Result Value Ref Range    WBC 17.32 (H) 3.40 - 10.80 10*3/mm3    RBC 4.24 3.77 - 5.28 10*6/mm3    Hemoglobin 11.6 (L) 12.0 - 15.9 g/dL    Hematocrit 36.1 34.0 - 46.6 %    MCV 85.1 79.0 - 97.0 fL    MCH 27.4 26.6 - 33.0 pg    MCHC 32.1 31.5 - 35.7 g/dL    RDW 15.0 12.3 - 15.4 %    RDW-SD 46.9 37.0 - 54.0 fl    MPV 9.6 6.0 - 12.0 fL    Platelets 331 140 - 450 10*3/mm3    Neutrophil % 75.4 42.7 - 76.0 %    Lymphocyte % 12.9 (L) 19.6 - 45.3 %    Monocyte % 8.7 5.0 - 12.0 %    Eosinophil % 2.3 0.3 - 6.2 %    Basophil % 0.3 0.0 - 1.5 %    Immature Grans % 0.4 0.0 - 0.5 %    Neutrophils, Absolute 13.08 (H) 1.70 - 7.00 10*3/mm3    Lymphocytes, Absolute 2.23 0.70 - 3.10 10*3/mm3    Monocytes, Absolute 1.50 (H) 0.10 - 0.90 10*3/mm3    Eosinophils, Absolute 0.39 0.00 - 0.40 10*3/mm3    Basophils, Absolute 0.05 0.00 - 0.20 10*3/mm3    Immature Grans, Absolute 0.07 (H) 0.00 - 0.05 10*3/mm3    nRBC 0.0 0.0 - 0.2 /100 WBC   Pregnancy, Urine - Urine, Clean Catch    Collection Time: 02/20/25  8:46 PM    Specimen: Urine, Clean Catch   Result Value Ref Range    HCG, Urine QL Negative Negative   Urinalysis With Microscopic If Indicated (No Culture) - Urine, Clean Catch    Collection Time: 02/20/25  8:46 PM    Specimen: Urine,  Clean Catch   Result Value Ref Range    Color, UA Yellow Yellow, Straw    Appearance, UA Clear Clear    pH, UA 5.5 5.0 - 8.0    Specific Gravity, UA 1.017 1.005 - 1.030    Glucose, UA Negative Negative    Ketones, UA Trace (A) Negative    Bilirubin, UA Negative Negative    Blood, UA Negative Negative    Protein, UA Negative Negative    Leuk Esterase, UA Negative Negative    Nitrite, UA Negative Negative    Urobilinogen, UA 0.2 E.U./dL 0.2 - 1.0 E.U./dL     CT Abdomen Pelvis With Contrast    Result Date: 2/20/2025  1. Large 23 cm x 15 cm x 21 cm craniocaudal predominantly fluid attenuating mass within the central abdomen with multiple internal septations likely ovarian concerning for a mucinous or serous malignancy. OB/GYN consult recommended. 2. Colitis of the descending colon. Electronically Signed: Jas Ramsay MD  2/20/2025 9:27 PM EST  Workstation ID: [id number]                                                    Medical Decision Making  Amount and/or Complexity of Data Reviewed  Labs: ordered.  Radiology: ordered.    Risk  Prescription drug management.      Patient had the above evaluation.  Results were discussed with the patient.  White blood cell count was elevated at 17.32.  CMP and lipase are unremarkable.  Urinalysis shows no UTI.  CT of the abdomen and pelvis shows colitis of the descending colon as well as a 23 x 15 x 21 cm mass is likely ovarian concerning for mucinous or serous malignancy.  Patient was started on Rocephin and Flagyl for the colitis.  I discussed with the hospitalist and the patient will be admitted for further evaluation and management including OB/GYN consult for coordination of continued workup of the mass seen on CT.      Final diagnoses:   Abdominal pain, unspecified abdominal location   Colitis   Abdominal mass, unspecified abdominal location       ED Disposition  ED Disposition       ED Disposition   Decision to Admit    Condition   --    Comment   Level of Care: Med/Surg [1]    Diagnosis: Abdominal pain [760489]   Admitting Physician: JT MONTIEL [[id number]]   Certification: I Certify That Inpatient Hospital Services Are Medically Necessary For Greater Than 2 Midnights                 No follow-up provider specified.       Medication List      No changes were made to your prescriptions during this visit.            Ahmet Espinal MD  02/20/25 4599

## 2025-02-21 NOTE — ED PROVIDER NOTES
Subjective   History of Present Illness    Review of Systems    Past Medical History:   Diagnosis Date    Allergic     Anxiety     Asthma     Depression     Headache     Hearing loss     Low back pain     Pneumonia     Urinary tract infection        Allergies   Allergen Reactions    Latex Itching and Other (See Comments)     Burning        Past Surgical History:   Procedure Laterality Date     SECTION      KNEE SURGERY         Family History   Problem Relation Age of Onset    Anxiety disorder Mother     Arthritis Mother     COPD Mother     Depression Mother     Diabetes Mother     Hypertension Mother     Thyroid disease Mother     Vision loss Mother     Alcohol abuse Father     Anxiety disorder Maternal Aunt     Anxiety disorder Maternal Grandmother     Arthritis Maternal Grandmother     COPD Maternal Grandmother     Diabetes Maternal Grandmother     Heart disease Maternal Grandmother     Hyperlipidemia Maternal Grandmother     Hypertension Maternal Grandmother     Miscarriages / Stillbirths Maternal Grandmother     Thyroid disease Maternal Grandmother     Vision loss Maternal Grandmother     Alcohol abuse Maternal Grandfather     Alcohol abuse Paternal Grandfather        Social History     Socioeconomic History    Marital status: Single   Tobacco Use    Smoking status: Every Day     Current packs/day: 0.25     Types: Cigarettes    Smokeless tobacco: Never   Vaping Use    Vaping status: Former    Substances: Nicotine   Substance and Sexual Activity    Alcohol use: Not Currently    Drug use: Not Currently    Sexual activity: Yes     Partners: Male           Objective   Physical Exam    Procedures           ED Course                                                       Medical Decision Making  This is Dr. Robi navarrete.  Patient was going to be admitted for hospitalist and gynecologic evaluation however the patient will be transferred to [facility] for further evaluation.  Dr. Mckeon did accept the patient in  transfer.    Problems Addressed:  Abdominal mass, unspecified abdominal location: complicated acute illness or injury  Abdominal pain, unspecified abdominal location: complicated acute illness or injury  Colitis: complicated acute illness or injury    Amount and/or Complexity of Data Reviewed  Labs: ordered.  Radiology: ordered.    Risk  Prescription drug management.        Final diagnoses:   Abdominal pain, unspecified abdominal location   Colitis   Abdominal mass, unspecified abdominal location       ED Disposition  ED Disposition       ED Disposition   Transfer to Another Facility     Condition   --    Comment   Level of Care: Med/Surg [1]  Diagnosis: Abdominal pain [771394]  Admitting Physician: JT MONTIEL [[id number]]  Certification: I Certify That Inpatient Hospital Services Are Medically Necessary For Greater Than 2 Midnights                 No follow-up provider specified.       Medication List      No changes were made to your prescriptions during this visit.            Robi Mensah MD  02/20/25 1918

## 2025-02-21 NOTE — SIGNIFICANT NOTE
Upon evaluation and review of imaging, I have reached out to Dr. Davis (OB/GYN on call) regarding the patient's abdominal/ovarian mass whether patient should be admitted for Obgyn evaluation versus transfer to [facility] due to  complexity of the mass and as well as size of the mass. Dr. Davis reviewed the imaging and recommended the patient to be transferred to [facility]. She will not be admitted to hospital needing further care at different facility. ED answering service was notified, and the patient is currently being transferred directly from ED to [facility] for further care. Per Dr. Mensah note, patient is accepted at [facility].

## 2025-02-21 NOTE — CASE MANAGEMENT/SOCIAL WORK
Continued Stay Note  MICKY Oh     Patient Name: Debbie Estevez  MRN: [id number]  Today's Date: 2/21/2025    Admit Date: 2/20/2025    Plan: Declined CM Assessement   Discharge Plan       Row Name 02/21/25 1001       Plan    Plan Declined CM Assessement    Plan Comments Attempted to complete CM assessment at bedside, pt declined. Per patient she is transferring to [facility]               Maintained distance greater than six feet and spent less than 15 minutes in the room    Aicha Jefferson RN    Phone [phone]  Fax [phone]

## 2025-02-25 LAB — BACTERIA SPEC AEROBE CULT: NORMAL

## 2025-07-16 NOTE — PROGRESS NOTES
Physical Therapy Daily Treatment Note      Patient: Debbie Estevez   : 1980  Diagnosis/ICD-10 Code:  Chronic low back pain with sciatica, sciatica laterality unspecified, unspecified back pain laterality [M54.40, G89.29]  Referring practitioner: Babak Delatorre DO.  Dr. Delatorre  Date of Initial Visit: 2022  Today's Date: 2022  Patient seen for 3 sessions.  POC 2x/2k x 12 weeks, exp 22  Insurance , exp 22    PRECAUTIONS:/PMH: Lupus, fibromyalgia, anxiety, CTS.      VISIT#: 3      Subjective :  Pain 3/10 left low back, hip and buttock and up into L thoracic and into left shoulder blade.  She is looking forward to trying mechanical lumbar traction.     Objective     See Exercise, Manual, and Modality Logs for complete treatment.  Initiated lumbar mechanical traction. Progression as noted.       Patient Education:  Traction theory and benefits.         Assessment/Plan:  Pt. Responded well to mechanical lumbar traction with decreased pain.  She felt that she needed more weight on traction.       Progress per Plan of Care: monitor response to traction, increase weight if indicated.             Timed:         Manual Therapy:         mins  76841;     Therapeutic Exercise:     19    mins  22075;     Neuromuscular Velma:  10      mins  47688;    Therapeutic Activity:          mins  50969;     Gait Training:           mins  20464;     Ultrasound:          mins  78010;    Ionto                                   mins   12655  Self Care                            mins   74211  Aquatic                               mins 24611    Un-Timed:  Electrical Stimulation:         mins  75548 ( );  Traction    16      mins 57976      Timed Treatment:  45    mins   Total Treatment:   45     mins    Paloma Jane PTA  Physical Therapist Assistant   [place] license:  #[id number]   Chart(s)/Patient